# Patient Record
Sex: FEMALE | Race: WHITE | NOT HISPANIC OR LATINO | Employment: UNEMPLOYED | URBAN - METROPOLITAN AREA
[De-identification: names, ages, dates, MRNs, and addresses within clinical notes are randomized per-mention and may not be internally consistent; named-entity substitution may affect disease eponyms.]

---

## 2017-06-01 LAB — POC BETA-HCG (QUAL): NEGATIVE

## 2017-07-19 LAB — POC BETA-HCG (QUAL): NEGATIVE

## 2019-01-23 ENCOUNTER — HISTORICAL (OUTPATIENT)
Dept: ADMINISTRATIVE | Facility: HOSPITAL | Age: 39
End: 2019-01-23

## 2019-02-14 ENCOUNTER — HOSPITAL ENCOUNTER (OUTPATIENT)
Dept: MEDSURG UNIT | Facility: HOSPITAL | Age: 39
End: 2019-02-15
Attending: FAMILY MEDICINE | Admitting: FAMILY MEDICINE

## 2019-02-14 LAB
ABS NEUT (OLG): 10.6 X10(3)/MCL (ref 1.5–6.9)
ALBUMIN SERPL-MCNC: 4 GM/DL (ref 3.4–5)
ALBUMIN/GLOB SERPL: 1.1 RATIO
ALP SERPL-CCNC: 86 UNIT/L (ref 30–113)
ALT SERPL-CCNC: 25 UNIT/L (ref 10–45)
AMYLASE SERPL-CCNC: 48 UNIT/L (ref 25–115)
APPEARANCE, UA: CLEAR
APTT PPP: 26.1 SECOND(S) (ref 25–35)
AST SERPL-CCNC: 21 UNIT/L (ref 15–37)
BACTERIA SPEC CULT: ABNORMAL /HPF
BASOPHILS # BLD AUTO: 0.1 X10(3)/MCL (ref 0–0.1)
BASOPHILS NFR BLD AUTO: 0 % (ref 0–1)
BILIRUB SERPL-MCNC: 0.4 MG/DL (ref 0.1–0.9)
BILIRUB UR QL STRIP: ABNORMAL
BILIRUBIN DIRECT+TOT PNL SERPL-MCNC: 0.1 MG/DL (ref 0–0.3)
BILIRUBIN DIRECT+TOT PNL SERPL-MCNC: 0.3 MG/DL
BUN SERPL-MCNC: 15 MG/DL (ref 10–20)
CALCIUM SERPL-MCNC: 9.5 MG/DL (ref 8–10.5)
CHLORIDE SERPL-SCNC: 104 MMOL/L (ref 100–108)
CO2 SERPL-SCNC: 27 MMOL/L (ref 21–35)
COLOR UR: ABNORMAL
CREAT SERPL-MCNC: 0.94 MG/DL (ref 0.7–1.3)
EOSINOPHIL # BLD AUTO: 0.1 X10(3)/MCL (ref 0–0.6)
EOSINOPHIL NFR BLD AUTO: 1 % (ref 0–5)
ERYTHROCYTE [DISTWIDTH] IN BLOOD BY AUTOMATED COUNT: 13.5 % (ref 11.5–17)
FLUAV AG UPPER RESP QL IA.RAPID: NEGATIVE
FLUBV AG UPPER RESP QL IA.RAPID: NEGATIVE
GLOBULIN SER-MCNC: 3.8 GM/DL
GLUCOSE (UA): NEGATIVE
GLUCOSE SERPL-MCNC: 85 MG/DL (ref 75–116)
HCT VFR BLD AUTO: 44.6 % (ref 36–48)
HGB BLD-MCNC: 15.1 GM/DL (ref 12–16)
HGB UR QL STRIP: NEGATIVE
IMM GRANULOCYTES # BLD AUTO: 0.04 10*3/UL (ref 0–0.02)
IMM GRANULOCYTES NFR BLD AUTO: 0.3 % (ref 0–0.43)
INR PPP: 0.9 (ref 0–1.2)
KETONES UR QL STRIP: ABNORMAL
LEUKOCYTE ESTERASE UR QL STRIP: NEGATIVE
LIPASE SERPL-CCNC: 99 UNIT/L (ref 21–261)
LYMPHOCYTES # BLD AUTO: 2.4 X10(3)/MCL (ref 0.5–4.1)
LYMPHOCYTES NFR BLD AUTO: 17 % (ref 15–40)
MCH RBC QN AUTO: 33 PG (ref 27–34)
MCHC RBC AUTO-ENTMCNC: 34 GM/DL (ref 31–36)
MCV RBC AUTO: 97 FL (ref 80–99)
MONOCYTES # BLD AUTO: 0.8 X10(3)/MCL (ref 0–1.1)
MONOCYTES NFR BLD AUTO: 6 % (ref 4–12)
MUCOUS THREADS URNS QL MICRO: ABNORMAL
NEUTROPHILS # BLD AUTO: 10.6 X10(3)/MCL (ref 1.5–6.9)
NEUTROPHILS NFR BLD AUTO: 76 % (ref 43–75)
NITRITE UR QL STRIP: NEGATIVE
PH UR STRIP: 5.5 [PH]
PLATELET # BLD AUTO: 309 X10(3)/MCL (ref 140–400)
PMV BLD AUTO: 10.7 FL (ref 6.8–10)
POTASSIUM SERPL-SCNC: 4 MMOL/L (ref 3.6–5.2)
PROT SERPL-MCNC: 7.8 GM/DL (ref 6.4–8.2)
PROT UR QL STRIP: 30 MG/DL
PROTHROMBIN TIME: 9.7 SECOND(S) (ref 9–12)
RBC # BLD AUTO: 4.58 X10(6)/MCL (ref 4.2–5.4)
RBC #/AREA URNS HPF: ABNORMAL /HPF
SODIUM SERPL-SCNC: 141 MMOL/L (ref 135–145)
SP GR UR STRIP: >=1.03
SQUAMOUS EPITHELIAL, UA: ABNORMAL /LPF
STREP A PCR (OHS): NOT DETECTED
UROBILINOGEN UR STRIP-ACNC: 0.2 EU/DL
WBC # SPEC AUTO: 14.1 X10(3)/MCL (ref 4.5–11.5)
WBC #/AREA URNS HPF: ABNORMAL /HPF

## 2019-02-15 LAB
ABS NEUT (OLG): 8 X10(3)/MCL (ref 1.5–6.9)
ALBUMIN SERPL-MCNC: 3 GM/DL (ref 3.4–5)
ALBUMIN/GLOB SERPL: 0.9 RATIO
ALP SERPL-CCNC: 71 UNIT/L (ref 30–113)
ALT SERPL-CCNC: 19 UNIT/L (ref 10–45)
AST SERPL-CCNC: 13 UNIT/L (ref 15–37)
BASOPHILS # BLD AUTO: 0.1 X10(3)/MCL (ref 0–0.1)
BASOPHILS NFR BLD AUTO: 1 % (ref 0–1)
BILIRUB SERPL-MCNC: 0.4 MG/DL (ref 0.1–0.9)
BILIRUBIN DIRECT+TOT PNL SERPL-MCNC: 0.1 MG/DL (ref 0–0.3)
BILIRUBIN DIRECT+TOT PNL SERPL-MCNC: 0.3 MG/DL
BUN SERPL-MCNC: 13 MG/DL (ref 10–20)
CALCIUM SERPL-MCNC: 9 MG/DL (ref 8–10.5)
CHLORIDE SERPL-SCNC: 107 MMOL/L (ref 100–108)
CO2 SERPL-SCNC: 27 MMOL/L (ref 21–35)
CREAT SERPL-MCNC: 0.88 MG/DL (ref 0.7–1.3)
EOSINOPHIL # BLD AUTO: 0.2 X10(3)/MCL (ref 0–0.6)
EOSINOPHIL NFR BLD AUTO: 1 % (ref 0–5)
ERYTHROCYTE [DISTWIDTH] IN BLOOD BY AUTOMATED COUNT: 13.5 % (ref 11.5–17)
EST. AVERAGE GLUCOSE BLD GHB EST-MCNC: 94 MG/DL
GLOBULIN SER-MCNC: 3.2 GM/DL
GLUCOSE SERPL-MCNC: 102 MG/DL (ref 75–116)
HBA1C MFR BLD: 4.9 % (ref 4.8–6)
HCT VFR BLD AUTO: 38.4 % (ref 36–48)
HGB BLD-MCNC: 12.8 GM/DL (ref 12–16)
IMM GRANULOCYTES # BLD AUTO: 0.03 10*3/UL (ref 0–0.02)
IMM GRANULOCYTES NFR BLD AUTO: 0.2 % (ref 0–0.43)
LYMPHOCYTES # BLD AUTO: 3.4 X10(3)/MCL (ref 0.5–4.1)
LYMPHOCYTES NFR BLD AUTO: 28 % (ref 15–40)
MCH RBC QN AUTO: 33 PG (ref 27–34)
MCHC RBC AUTO-ENTMCNC: 33 GM/DL (ref 31–36)
MCV RBC AUTO: 99 FL (ref 80–99)
MONOCYTES # BLD AUTO: 0.7 X10(3)/MCL (ref 0–1.1)
MONOCYTES NFR BLD AUTO: 6 % (ref 4–12)
NEUTROPHILS # BLD AUTO: 8 X10(3)/MCL (ref 1.5–6.9)
NEUTROPHILS NFR BLD AUTO: 65 % (ref 43–75)
PLATELET # BLD AUTO: 261 X10(3)/MCL (ref 140–400)
PMV BLD AUTO: 10.4 FL (ref 6.8–10)
POTASSIUM SERPL-SCNC: 3.8 MMOL/L (ref 3.6–5.2)
PROT SERPL-MCNC: 6.2 GM/DL (ref 6.4–8.2)
RBC # BLD AUTO: 3.89 X10(6)/MCL (ref 4.2–5.4)
SODIUM SERPL-SCNC: 142 MMOL/L (ref 135–145)
TSH SERPL-ACNC: 2.67 MIU/ML (ref 0.36–3.74)
WBC # SPEC AUTO: 12.3 X10(3)/MCL (ref 4.5–11.5)

## 2019-02-17 LAB — FINAL CULTURE: NORMAL

## 2019-12-02 ENCOUNTER — HISTORICAL (OUTPATIENT)
Dept: RADIOLOGY | Facility: HOSPITAL | Age: 39
End: 2019-12-02

## 2020-01-13 LAB
ABS NEUT (OLG): 7.1 X10(3)/MCL (ref 1.5–6.9)
ALBUMIN SERPL-MCNC: 3.5 GM/DL (ref 3.4–5)
ALBUMIN/GLOB SERPL: 0.9 RATIO
ALP SERPL-CCNC: 78 UNIT/L (ref 30–113)
ALT SERPL-CCNC: 19 UNIT/L (ref 10–45)
APTT PPP: 27 SECOND(S) (ref 25–35)
AST SERPL-CCNC: 15 UNIT/L (ref 15–37)
BASOPHILS # BLD AUTO: 0.1 X10(3)/MCL (ref 0–0.1)
BASOPHILS NFR BLD AUTO: 1 % (ref 0–1)
BILIRUB SERPL-MCNC: 0.2 MG/DL (ref 0.1–0.9)
BILIRUBIN DIRECT+TOT PNL SERPL-MCNC: 0.1 MG/DL
BILIRUBIN DIRECT+TOT PNL SERPL-MCNC: 0.1 MG/DL (ref 0–0.3)
BUN SERPL-MCNC: 15 MG/DL (ref 10–20)
CALCIUM SERPL-MCNC: 9.5 MG/DL (ref 8–10.5)
CHLORIDE SERPL-SCNC: 105 MMOL/L (ref 100–108)
CO2 SERPL-SCNC: 28 MMOL/L (ref 21–35)
CREAT SERPL-MCNC: 0.73 MG/DL (ref 0.7–1.3)
EOSINOPHIL # BLD AUTO: 0.2 X10(3)/MCL (ref 0–0.6)
EOSINOPHIL NFR BLD AUTO: 2 % (ref 0–5)
ERYTHROCYTE [DISTWIDTH] IN BLOOD BY AUTOMATED COUNT: 12.6 % (ref 11.5–17)
GLOBULIN SER-MCNC: 3.8 GM/DL
GLUCOSE SERPL-MCNC: 88 MG/DL (ref 75–116)
HCT VFR BLD AUTO: 43.9 % (ref 36–48)
HGB BLD-MCNC: 15.1 GM/DL (ref 12–16)
IMM GRANULOCYTES # BLD AUTO: 0.03 10*3/UL (ref 0–0.02)
IMM GRANULOCYTES NFR BLD AUTO: 0.3 % (ref 0–0.43)
INR PPP: 0.9 (ref 0–1.2)
LYMPHOCYTES # BLD AUTO: 3.8 X10(3)/MCL (ref 0.5–4.1)
LYMPHOCYTES NFR BLD AUTO: 32 % (ref 15–40)
MCH RBC QN AUTO: 32 PG (ref 27–34)
MCHC RBC AUTO-ENTMCNC: 34 GM/DL (ref 31–36)
MCV RBC AUTO: 93 FL (ref 80–99)
MONOCYTES # BLD AUTO: 0.7 X10(3)/MCL (ref 0–1.1)
MONOCYTES NFR BLD AUTO: 6 % (ref 4–12)
NEUTROPHILS # BLD AUTO: 7.1 X10(3)/MCL (ref 1.5–6.9)
NEUTROPHILS NFR BLD AUTO: 60 % (ref 43–75)
PLATELET # BLD AUTO: 313 X10(3)/MCL (ref 140–400)
PMV BLD AUTO: 9.8 FL (ref 6.8–10)
POTASSIUM SERPL-SCNC: 4.5 MMOL/L (ref 3.6–5.2)
PROT SERPL-MCNC: 7.3 GM/DL (ref 6.4–8.2)
PROTHROMBIN TIME: 9.4 SECOND(S) (ref 9–12)
RBC # BLD AUTO: 4.7 X10(6)/MCL (ref 4.2–5.4)
SODIUM SERPL-SCNC: 142 MMOL/L (ref 135–145)
WBC # SPEC AUTO: 12 X10(3)/MCL (ref 4.5–11.5)

## 2020-01-15 LAB — B-HCG SERPL QL: NEGATIVE

## 2020-01-16 ENCOUNTER — HISTORICAL (OUTPATIENT)
Dept: ANESTHESIOLOGY | Facility: HOSPITAL | Age: 40
End: 2020-01-16

## 2021-10-14 ENCOUNTER — HISTORICAL (OUTPATIENT)
Dept: RADIOLOGY | Facility: HOSPITAL | Age: 41
End: 2021-10-14

## 2021-11-11 ENCOUNTER — HISTORICAL (OUTPATIENT)
Dept: WOUND CARE | Facility: HOSPITAL | Age: 41
End: 2021-11-11

## 2021-12-15 ENCOUNTER — HISTORICAL (OUTPATIENT)
Dept: RADIOLOGY | Facility: HOSPITAL | Age: 41
End: 2021-12-15

## 2021-12-29 ENCOUNTER — HISTORICAL (OUTPATIENT)
Dept: RADIOLOGY | Facility: HOSPITAL | Age: 41
End: 2021-12-29

## 2022-01-26 ENCOUNTER — HISTORICAL (OUTPATIENT)
Dept: ADMINISTRATIVE | Facility: HOSPITAL | Age: 42
End: 2022-01-26

## 2022-04-10 ENCOUNTER — HISTORICAL (OUTPATIENT)
Dept: ADMINISTRATIVE | Facility: HOSPITAL | Age: 42
End: 2022-04-10
Payer: MEDICAID

## 2022-04-26 VITALS
DIASTOLIC BLOOD PRESSURE: 81 MMHG | SYSTOLIC BLOOD PRESSURE: 114 MMHG | BODY MASS INDEX: 38.64 KG/M2 | WEIGHT: 231.94 LBS | HEIGHT: 65 IN

## 2022-04-30 NOTE — OP NOTE
ADMITTING DIAGNOSIS:  Chronic cholecystitis with gallstones in the gallbladder.    PROCEDURES:    1. Laparoscopic cholecystectomy.  2. Intraoperative cholangiography under fluoroscopy.    POSTOPERATIVE DIAGNOSES:    1. Chronic cholecystitis with gallstones in the gallbladder.  2. No common duct obstruction.  3. No pancreatitis.    INDICATIONS:  Patient is a 39-year-old  female with hypertension, osteoarthritis, anxiety, bipolar disorder with depression.  She has posttraumatic stress disorder secondary to issues during her childhood.  The patient recently had a gastric sleeve done in 2018 in [facility] and dropped weight from 395 pounds down to 215.  She lives in [place], works in home health and recently had left upper quadrant abdominal pain with fatty food intolerance.  The patient had a family history of biliary disease with nausea, vomiting, bloating, and an inability to maintain diet.  She had an ultrasound on 12/02/2019, that demonstrated gallstones in the gallbladder.  She was consented for cholecystectomy.    DESCRIPTION OF PROCEDURE:  Brought to the operating room in supine position.  General anesthetic.  Prepped and draped in a sterile fashion.  I chose to go ahead and do a supraumbilical incision with insertion of Laron needle, insufflation of abdomen to 14 mmHg with insertion of a 5 mm trocar.  The patient then underwent insertion of 2 lateral 5 mm trocars and one 5 mm trocar just to the right of falciform ligament.  The gallbladder was grasped and mobilized.  Cystic duct and artery were isolated.  [place] was clearly dissected.  Intraoperative cholangiogram was obtained.  The patient underwent clipping of the cystic duct and artery, cauterization of the gallbladder off the liver bed and removal through the epigastric trocar site.  The 5 mm trocar site aponeurosis was closed with 0 Vicryl on a  needle.  Subcu was closed with 4-0 plain gut suture.  Dermabond was used  for the skin.  It should be noted that there was some bleeding from the epigastric incision site that I suture-ligated with a GraNee needle and 0 Vicryl.  The patient had local anesthetic injected.  No problems were encountered.  I appreciate the consultation referral from  Lalo Gonzales and will notify him of my findings.        ESTEE/NILDA   DD: 01/16/2020 1347   DT: 01/16/2020 1407  Job # [id number]    cc: Lalo Gonzales NP

## 2022-05-03 NOTE — HISTORICAL [facility] CERNER
This is a historical note converted from [place]. Formatting and pictures may have been removed.  Please reference [place] for original formatting and attached multimedia. Admit and Discharge Dates  Admit Date: 02/14/2019  Discharge Date: 02/15/2019  ?  Physicians  Attending Physician - Drew RIVERA, Marito JARA  Admitting Physician - Drew RIVERA, Marito JARA  Primary Care Physician - Donato MERCHANT, Malaika Sewell  ?  Discharge Diagnosis  1.?Sigmoid diverticulitis?K57.32  2.?Acute colitis?K52.9  3.?Malaise?R53.81  4.?Throat pain - Adult?[id number]  5.?Diabetes mellitus?E11.9  6.?HTN (hypertension)?I10  7.?Anxiety?F41.9  Surgical Procedures  No procedures recorded for this visit.  Immunizations  No immunizations recorded for this visit.  ?  Hospital Course  Clinical course was?uneventful. ?She was started on IV?Flagyl and Cipro?after CT scan?of the abdomen showed?colitis and the beginning of a diverticulitis.?Labs were obtained and?trended to monitor response to?treatment.??Stool and throat cultures were?ordered?after her initial?strep?and influenza?screen?were negative.??Upon further assessment the patient,?it was determined that she has had multiple?episodes of tonsillitis?and was recommended in the past?be evaluated by ENT for removal.??Monospot was ordered. ?The rest of her clinical course?was uneventful. ?She remained afebrile,?her labs improved, and?she was started clear liquid diet?and it?was advanced as tolerated. ?She was subsequently medically cleared for discharge?in stable condition?with follow-up instructions and prescriptions  ?  D/C Time: 33 minutes  Significant Findings  Reason For Exam  Abdominal Pain  ?  Radiology Report  CT ABDOMEN AND PELVIS WITH CONTRAST:  ?  HISTORY: Abdominal Pain ? ??  ?  PATIENT RADIATION DOSE: ?CTDI vol(mGy) WITH: 71.40?  ?? ? ? ? ? ? ? ? ? ? ? ? ? ? ? ? ? ? ? ? ? ? ? DLP(mGycm) WITH:  3553.40?  ?  As per PQRS measures, all CT scans at this facility used  dose  modulation, iterative reconstruction, and/or weight based dose  adjustment when appropriate to reduce radiation dose to as low as  reasonably achievable.  ?  COMPARISON:None available  ?  FINDINGS: Serial axial images were obtained through the abdomen and  pelvis with the administration of ?IV contrast. Coronal and sagittal  reconstructions where also obtained. Degenerative changes are noted to  the thoracolumbar spine. The heart is upper normal in size. Mild hazy  groundglass interstitial markings are noted to the lung bases. There  is mild mucosal prominence at a small fixed hiatus hernia with suspect  adjacent surgical sutures. There is elevation of the anterior right  hemidiaphragm. The liver, gallbladder, spleen, adrenal glands, and  pancreas are grossly within normal limits without the administration  of IV contrast. The appendix is within normal limits. There is  localized mucosal thickening and edema/stranding at the ascending  colon. A few scattered diverticula are noted to the sigmoid colon with  mild mucosal prominence versus underdistention and questionable small  amount of surrounding edema. The uterus is normal in size. The bladder  is nondistended. No dilated loops of bowel are identified. No  significant free fluid collection is seen. The kidneys are relatively  symmetric in size. No hydronephrosis is identified. Minimal  atherosclerosis is seen within the iliac arteries. Multiple lymph  nodes measuring up to 1 cm are seen within the inguinal region  bilaterally.  ?  IMPRESSION:  1. Surrounding edema and mucosal thickening at the ascending colon  compatible with a localized infectious/inflammatory process/colitis.  The appendix and terminal ileum appear grossly within normal limits.  2. Small fixed hiatus hernia with adjacent surgical sutures at the  stomach  3. A few scattered diverticula at the sigmoid colon with mild mucosal  prominence and question small bifrontal edema. This may represent  a  mild or early acute diverticulitis.  ?  Objective  Vitals & Measurements  T:?36.6? ?C (Oral)? TMIN:?36.4? ?C (Oral)? TMAX:?36.9? ?C (Oral)? HR:?53(Monitored)? RR:?18? BP:?128/86? SpO2:?100%? WT:?114?kg?  Physical Exam  General: Alert, sitting up in bed, in no acute distress  Eyes: EOMI, no scleral icterus  HEENT: NCAT, oral mucosa moist, neck supple.  CV: RRR  Respiratory: CTA bilaterally  GI: + BS, soft, nontender, nondistended  : No CVA or suprapubic tenderness palpation  MSK/extremities: No pitting edema, cyanosis, or clubbing.? MAEW  Neuro: GCS 15  Psych: Calm, cooperative with exam.? Mood and affect appropriate.  Integument: Warm, dry  Patient Discharge Condition  Stable  Discharge Disposition  Home   Discharge Medication Reconciliation  Prescribed  ciprofloxacin (Cipro 500 mg oral tablet)?500 mg, Oral, q12hr  codeine-guaifenesin (Cheratussin AC 10 mg-100 mg/5 mL oral syrup)?10 mL, Oral, q4hr, PRN as needed for cough and congestion  lactobacillus acidophilus and bulgaricus (lactobacillus acidophilus and bulgaricus oral tablet, chewable)?4 tab(s), Oral, TIDWM  metroNIDAZOLE (Flagyl 500 mg oral tablet)?500 mg, Oral, TID  Continue  buPROPion (BUPROPION ? ?TAB 100MG SR)?100 mg, Oral, qAM  clonazePAM (clonazePAM 2 mg oral tablet)?2 mg, Oral, TID  doxepin (DOXEPIN HCL ?CAP 25MG)?25 mg, Oral, qPM  gabapentin (gabapentin 600 mg oral tablet)?See Instructions  lisinopril (LISINOPRIL ? TAB 10MG)?10 mg, Oral, Daily  methocarbamol (METHOCARBAM ?TAB 500MG)  naproxen (Naprosyn 500 mg oral tablet)?500 mg, Oral, BID, PRN as needed for pain  ?  Education and Orders Provided  Diverticulitis, Easy-to-Read  Colitis (JCANZALONE)  Discharge - 02/15/19 10:02:00 CST, Home Pending ability to tolerate breakfast?  Discharge Activity - Activity as Tolerated?  Discharge Diet - Low Fat?  ?  Follow up  Abraham Silvestre, within 2 days, on  ????For recurring tonsilitis  Follow-up with PCP in 3 to 5 days  Anytime the conditions worsen, return  to clinic or go to ED  ?

## 2022-05-03 NOTE — HISTORICAL [facility] CERNER
This is a historical note converted from [place]. Formatting and pictures may have been removed.  Please reference [place] for original formatting and attached multimedia. Chief Complaint  abd cramping, diarrhea, throat pain, tonsilis swolling. pt. reports throat pain first, progressed to abd pain after two days.  History of Present Illness  38-year-old female?with past medical history of hypertension anxiety?presents to the ED for a?3-day history of?abdominal pain.??Pain was?cramping, radiating from the epigastric area to the umbilicus to the right lower quadrant.? + Sick contacts. ?No improvement with over-the-counter Tylenol or Mucinex. It became associated with nonbloody, watery?diarrhea 2 days prior to admission?with?nausea, malaise?and?throat pain. ?ED workup showed a white count of 14,000?with a CT scan?of the abdomen was positive for a?with early signs of diverticulitis.? [facility] was consulted for admission.  Review of Systems  CONSTITUTIONAL: No weight loss, No fever, no chills,+ weakness,?+ fatigue.  EYES: No diplopia, no blurred vision.  ENT: No earache, no sore throat, no runny nose.  CARDIOVASCULAR:? No pressure, no squeezing, no strangling, no tightness, no heaviness, no aching about the chest, neck, axilla or epigastrium.  RESPIRATORY: No cough, no shortness of breath, no PND, no orthopnea.  GASTROINTESTINAL:?See HPI.  GENITOURINARY: No dysuria, no urinary frequency, no urgency, no dribbling.  MUSCULOSKELETAL: +?Muscle ache, no back pain, no joint pain, no joint stiffness.  SKIN:? No change in skin, no change in hair, no change in nails.  NEUROLOGIC:? No paresthesias, no fasciculations, no seizures, no weakness.  PSYCHIATRIC:? No disorder of thought or mood.  ENDOCRINE:? No heat or cold intolerance, no polyuria, no polydipsia.  HEMATOLOGICAL:? No easy bruising, no easy bleeding.  Physical Exam  Vitals & Measurements  T:?36.4? ?C (Oral)? TMIN:?36.4? ?C (Oral)? TMAX:?36.9? ?C (Oral)? HR:?66(Peripheral)?  RR:?18? BP:?128/82? SpO2:?99%? WT:?114?kg?  GEN: Patient alert. ?Obese female, In no pain or acute?distress  Eyes: No scleral icterus, EOMI  HENT: NCAT, OMM, neck supple  CV: RRR. No M/R/G, No JVD noted. peripheral pulses palpable  RESP: Symmetrical rise and fall of chest. Breathing unlabored. CTA B/L. No wheezing, rhonchi or crackles  ABD: Hyperactive BS noted in all 4 quadrants, no HSM, soft, generalized tenderness?without rebound?tenderness or guarding, ND  MSK/EXT: No gross deformities noted, MAEW  SKIN: Dry, Warm, Normal Turgor  NEURO: GCS of 15, sensation and strength grossly normal.  PSYCH: Mood and affect appropriate, Good judgement, cooperative with exam  Assessment/Plan  1.?Sigmoid diverticulitis?K57.32  2.?Acute colitis?K52.9  3.?Malaise?R53.81  4.?Throat pain - Adult?[id number]  5.?Diabetes mellitus?E11.9  6.?HTN (hypertension)?I10  7.?Anxiety?F41.9  Orders:  buPROPion, 100 mg, form: Tab, Oral, qAM, first dose 02/14/19 20:18:00 CST, STAT  doxepin, 25 mg, form: Cap, Oral, qPM, first dose 02/14/19 20:18:00 CST, STAT  enoxaparin, 40 mg, form: Injection, Subcutaneous, Daily, first dose 02/15/19 9:00:00 CST, for all risk catagories  gabapentin, form: Tab, Oral, QID, first dose 02/14/19 20:18:00 CST, STAT  lisinopril, 10 mg, form: Tab, Oral, Daily, first dose 02/14/19 20:18:00 CST, STAT  pantoprazole + Sodium Chloride 0.9% intravenous solution 100 mL, 40 mg, IV Piggyback, BID, Infuse over: 1 hr, first dose 02/14/19 21:00:00 CST  Comprehensive Metabolic Panel, Routine collect, 02/15/19 5:00:00 CST, Blood, Stop date 02/15/19 5:00:00 CST, Lab Collect, in AM, 02/15/19 5:00:00 CST  Fecal Leukocytes - Lactoferrin on stool, Stat collect, 02/14/19 20:20:00 CST, Stool, Stop date 02/14/19 20:21:00 CST, Nurse collect, 02/14/19 20:20:00 CST  Hemoglobin A1c, Routine collect, 02/15/19 5:00:00 CST, Blood, Stop date 02/15/19 5:00:00 CST, Lab Collect, 02/15/19 5:00:00 CST  Occult Blood Stool Screening  Test, Stat collect, 02/14/19 20:20:00 CST, Stool, Stop date 02/14/19 20:21:00 CST, Nurse collect, 02/14/19 20:20:00 CST  Stool Culture, Stat collect, 02/14/19 20:20:00 CST, Stool, Nurse collect, Stop date 02/14/19 20:21:00 CST  Thyroid Stimulating Hormone, Routine collect, 02/15/19 5:00:00 CST, Blood, Stop date 02/15/19 5:00:00 CST, Lab Collect, 02/15/19 5:00:00 CST  -Start IV Flagyl and Cipro  -Probiotics?3 times daily with meals  -Obtain stool culture if sample provided  -Trend labs to monitor response to treatment?  -Replete electrolytes as warranted  -GI/DVT prophylaxis as warranted  ?  Reviewed and restarted appropriate home medications once reconciled to ensure that chronic medical problems remained stable during admission   Problem List/Past Medical History  Ongoing  Anxiety  Back pain  Diabetes mellitus  Diverticulosis of sigmoid colon  HTN (hypertension)  HTN - Hypertension  Morbid obesity  Obesity  Historical  No qualifying data  Procedure/Surgical History  Ovarian cyst (2004)  Laparoscopic right ovarian cystectomy  Sleeve resection of stomach   Medications  Inpatient  buPROPion 100 mg oral tablet, 100 mg= 1 tab(s), Oral, qAM  ciprofloxacin 400 mg/200 mL intravenous solution, 400 mg= 200 mL, IV, q12hr  doxepin 25 mg oral capsule, 25 mg= 1 cap(s), Oral, qPM  enoxaparin 40 mg/0.4 mL subcutaneous solution, 40 mg= 0.4 mL, Subcutaneous, Daily  gabapentin 600 mg oral tablet, 1 tab(s), Oral, QID  Lactated Ringers Injection intravenous solution 1,000 mL, 1000 mL, IV  lactobacillus acidophilus and bulgaricus oral tablet, chewable, 1 tab(s), Oral, TID  lisinopril 10 mg oral tablet, 10 mg= 1 tab(s), Oral, Daily  Metronidazole 500 mg / 100 ml premix, 500 mg= 100 mL, IV, q8hr  morphine 2 mg/mL injectable solution, 2 mg= 0.5 mL, IV, q4hr, PRN  NS (0.9% Sodium Chloride) Infusion 1,000 mL, 1000 mL, IV  Protonix IV 40 mg intravenous injection +  100 mL  Zofran 2 mg/mL injectable solution, 4 mg= 2 mL, IV Push, q4hr,  PRN  Home  BUPROPION TAB 100MG SR, 100 mg= 1 tab(s), Oral, qAM  clonazePAM 2 mg oral tablet, 2 mg= 1 tab(s), Oral, TID  DOXEPIN HCL CAP 25MG, 25 mg, Oral, qPM  gabapentin 600 mg oral tablet, See Instructions, 11 refills  LISINOPRIL TAB 10MG, 10 mg= 1 tab(s), Oral, Daily  METHOCARBAM TAB 500MG  Naprosyn 500 mg oral tablet, 500 mg= 1 tab(s), Oral, BID, PRN, 1 refills  Allergies  No Known Allergies  Social History  Alcohol - No Risk, 06/02/2015  Past, Wine, 1-2 times per year, 01/23/2019  Employment/School  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 Activity level: Moderate physical work. Highest education level: High school. Operates hazardous equipment: No., 05/24/2017  Exercise - Does not exercise, 06/02/2015  Self assessment: Poor condition., 05/24/2017  Home/Environment  Lives with Children, Significant other. Living situation: Home/Independent. Alcohol abuse in household: No. Substance abuse in household: No. Smoker in household: No. Injuries/Abuse/Neglect in household: No. Feels unsafe at home: No. Family/Friends available for support: Yes. Concern for family members at home: No. Major illness in household: No. Financial concerns: No. TV/Computer concerns: No., 05/24/2017  Nutrition/Health  Regular, 06/02/2015  Sexual  Sexual orientation: Lesbian, diehl or homosexual., 01/23/2019  Substance Abuse - No Risk, 06/02/2015  Never, 05/07/2016  Tobacco - No Risk, 06/02/2015  Never (less than 100 in lifetime), N/A, 02/14/2019  Never (less than 100 in lifetime), N/A, 01/23/2019  Family History  Hypertension.: Mother and Father.  Lab Results  Test Name Test Result Date/Time   Sodium Lvl 141 mmol/L 02/14/2019 14:48 CST   Potassium Lvl 4.0 mmol/L 02/14/2019 14:48 CST   Chloride 104 mmol/L 02/14/2019 14:48 CST   CO2 27 mmol/L 02/14/2019 14:48 CST   Glucose Lvl 85 mg/dL 02/14/2019 14:48 CST   BUN 15 mg/dL 02/14/2019 14:48 CST   Creatinine 0.94 mg/dL 02/14/2019 14:48 CST   INR 0.9 02/14/2019 14:48 CST   WBC 14.1  x10(3)/mcL (High) 02/14/2019 14:48 CST   Hgb 15.1 gm/dL 02/14/2019 14:48 CST   Hct 44.6 % 02/14/2019 14:48 CST   Platelet 309 x10(3)/mcL 02/14/2019 14:48 CST   Influ A PCR Negative UA 02/14/2019 15:09 CST   Influ B PCR Negative UA 02/14/2019 15:09 CST   Strep A PCR NOT DETECTED. 02/14/2019 15:09 CST   Diagnostic Results  (02/14/2019 16:25 CST CT Abdomen and Pelvis W Contrast)  Reason For Exam  Abdominal Pain  ?  Radiology Report  CT ABDOMEN AND PELVIS WITH CONTRAST:  ?  HISTORY: Abdominal Pain ? ??  ?  PATIENT RADIATION DOSE: ?CTDI vol(mGy) WITH: 71.40?  ?? ? ? ? ? ? ? ? ? ? ? ? ? ? ? ? ? ? ? ? ? ? ? DLP(mGycm) WITH:  3553.40?  ?  As per PQRS measures, all CT scans at this facility used dose  modulation, iterative reconstruction, and/or weight based dose  adjustment when appropriate to reduce radiation dose to as low as  reasonably achievable.  ?  COMPARISON:None available  ?  FINDINGS: Serial axial images were obtained through the abdomen and  pelvis with the administration of ?IV contrast. Coronal and sagittal  reconstructions where also obtained. Degenerative changes are noted to  the thoracolumbar spine. The heart is upper normal in size. Mild hazy  groundglass interstitial markings are noted to the lung bases. There  is mild mucosal prominence at a small fixed hiatus hernia with suspect  adjacent surgical sutures. There is elevation of the anterior right  hemidiaphragm. The liver, gallbladder, spleen, adrenal glands, and  pancreas are grossly within normal limits without the administration  of IV contrast. The appendix is within normal limits. There is  localized mucosal thickening and edema/stranding at the ascending  colon. A few scattered diverticula are noted to the sigmoid colon with  mild mucosal prominence versus underdistention and questionable small  amount of surrounding edema. The uterus is normal in size. The bladder  is nondistended. No dilated loops of bowel are identified. No  significant free  fluid collection is seen. The kidneys are relatively  symmetric in size. No hydronephrosis is identified. Minimal  atherosclerosis is seen within the iliac arteries. Multiple lymph  nodes measuring up to 1 cm are seen within the inguinal region  bilaterally.  ?  IMPRESSION:  1. Surrounding edema and mucosal thickening at the ascending colon  compatible with a localized infectious/inflammatory process/colitis.  The appendix and terminal ileum appear grossly within normal limits.  2. Small fixed hiatus hernia with adjacent surgical sutures at the  stomach  3. A few scattered diverticula at the sigmoid colon with mild mucosal  prominence and question small bifrontal edema. This may represent a  mild or early acute diverticulitis.  ?  ? [1]     [1]?CT Abdomen and Pelvis W Contrast; René RIVERA, David Eisenberg 02/14/2019 16:25 CST

## 2022-05-24 ENCOUNTER — HOSPITAL ENCOUNTER (OUTPATIENT)
Dept: RADIOLOGY | Facility: HOSPITAL | Age: 42
Discharge: HOME | End: 2022-05-24
Attending: NURSE PRACTITIONER
Payer: MEDICAID

## 2022-05-24 DIAGNOSIS — R52 PAIN: ICD-10-CM

## 2022-05-24 PROCEDURE — 72070 X-RAY EXAM THORAC SPINE 2VWS: CPT | Mod: TC

## 2022-05-24 PROCEDURE — 72100 X-RAY EXAM L-S SPINE 2/3 VWS: CPT | Mod: TC

## 2022-05-24 PROCEDURE — 73560 X-RAY EXAM OF KNEE 1 OR 2: CPT | Mod: TC,RT

## 2022-08-09 ENCOUNTER — HOSPITAL ENCOUNTER (OUTPATIENT)
Dept: RADIOLOGY | Facility: HOSPITAL | Age: 42
Discharge: HOME | End: 2022-08-09
Attending: NURSE PRACTITIONER
Payer: MEDICAID

## 2022-08-09 DIAGNOSIS — M54.12 CERVICAL RADICULOPATHY: ICD-10-CM

## 2022-08-09 PROCEDURE — 72050 X-RAY EXAM NECK SPINE 4/5VWS: CPT | Mod: TC

## 2022-09-21 ENCOUNTER — HISTORICAL (OUTPATIENT)
Dept: ADMINISTRATIVE | Facility: HOSPITAL | Age: 42
End: 2022-09-21
Payer: MEDICAID

## 2022-12-22 ENCOUNTER — HOSPITAL ENCOUNTER (OUTPATIENT)
Dept: RADIOLOGY | Facility: HOSPITAL | Age: 42
Discharge: HOME | End: 2022-12-22
Attending: NURSE PRACTITIONER
Payer: MEDICAID

## 2022-12-22 DIAGNOSIS — Z12.31 ENCOUNTER FOR SCREENING MAMMOGRAM FOR BREAST CANCER: ICD-10-CM

## 2023-02-15 DIAGNOSIS — L98.7 EXCESS SKIN OF ABDOMEN: Primary | ICD-10-CM

## 2023-03-27 PROBLEM — M51.369 DEGENERATIVE DISC DISEASE, LUMBAR: Status: ACTIVE | Noted: 2023-03-27

## 2023-03-27 PROBLEM — M51.36 DEGENERATIVE DISC DISEASE, LUMBAR: Status: ACTIVE | Noted: 2023-03-27

## 2023-04-25 PROBLEM — E11.9 DIABETES MELLITUS: Status: ACTIVE | Noted: 2023-04-25

## 2023-04-25 PROBLEM — F41.9 ANXIETY DISORDER, UNSPECIFIED: Status: ACTIVE | Noted: 2023-04-25

## 2023-04-25 PROBLEM — R20.2 NUMBNESS AND TINGLING: Status: ACTIVE | Noted: 2023-04-25

## 2023-04-25 PROBLEM — R20.0 NUMBNESS AND TINGLING: Status: ACTIVE | Noted: 2023-04-25

## 2023-04-25 PROBLEM — M54.50 LOW BACK PAIN RADIATING TO RIGHT LEG: Status: ACTIVE | Noted: 2023-04-25

## 2023-04-25 PROBLEM — M79.604 LOW BACK PAIN RADIATING TO RIGHT LEG: Status: ACTIVE | Noted: 2023-04-25

## 2023-04-27 PROBLEM — R42 POSTURAL DIZZINESS WITH PRESYNCOPE: Status: ACTIVE | Noted: 2023-04-27

## 2023-04-27 PROBLEM — E66.9 OBESITY: Status: ACTIVE | Noted: 2023-04-27

## 2023-04-27 PROBLEM — M48.061 SPINAL STENOSIS OF LUMBAR REGION: Status: ACTIVE | Noted: 2023-04-27

## 2023-04-27 PROBLEM — M54.9 BACK PAIN: Status: ACTIVE | Noted: 2023-04-27

## 2023-04-27 PROBLEM — K57.30 DIVERTICULOSIS OF SIGMOID COLON: Status: ACTIVE | Noted: 2023-04-27

## 2023-04-27 PROBLEM — R55 POSTURAL DIZZINESS WITH PRESYNCOPE: Status: ACTIVE | Noted: 2023-04-27

## 2023-04-27 PROBLEM — I10 HYPERTENSION: Status: ACTIVE | Noted: 2023-04-27

## 2023-04-30 PROBLEM — R55 VASOVAGAL EPISODE: Status: ACTIVE | Noted: 2023-04-30

## 2023-06-13 ENCOUNTER — CLINICAL SUPPORT (OUTPATIENT)
Dept: RESPIRATORY THERAPY | Facility: HOSPITAL | Age: 43
End: 2023-06-13
Attending: NURSE PRACTITIONER
Payer: MEDICAID

## 2023-06-13 DIAGNOSIS — I10 ESSENTIAL HYPERTENSION, MALIGNANT: Primary | ICD-10-CM

## 2023-06-13 DIAGNOSIS — I10 ESSENTIAL HYPERTENSION, MALIGNANT: ICD-10-CM

## 2023-06-13 PROCEDURE — 93010 ELECTROCARDIOGRAM REPORT: CPT | Mod: ,,, | Performed by: INTERNAL MEDICINE

## 2023-06-13 PROCEDURE — 93005 ELECTROCARDIOGRAM TRACING: CPT

## 2023-06-13 PROCEDURE — 93010 EKG 12-LEAD: ICD-10-PCS | Mod: ,,, | Performed by: INTERNAL MEDICINE

## 2023-06-29 ENCOUNTER — HOSPITAL ENCOUNTER (OUTPATIENT)
Dept: RADIOLOGY | Facility: HOSPITAL | Age: 43
Discharge: HOME | End: 2023-06-29
Attending: NURSE PRACTITIONER
Payer: MEDICAID

## 2023-06-29 DIAGNOSIS — Z12.31 BREAST CANCER SCREENING BY MAMMOGRAM: ICD-10-CM

## 2023-06-29 PROCEDURE — 77067 SCR MAMMO BI INCL CAD: CPT | Mod: 26,,, | Performed by: STUDENT IN AN ORGANIZED HEALTH CARE EDUCATION/TRAINING PROGRAM

## 2023-06-29 PROCEDURE — 77067 MAMMO DIGITAL SCREENING BILAT WITH TOMO: ICD-10-PCS | Mod: 26,,, | Performed by: STUDENT IN AN ORGANIZED HEALTH CARE EDUCATION/TRAINING PROGRAM

## 2023-06-29 PROCEDURE — 77067 SCR MAMMO BI INCL CAD: CPT | Mod: TC

## 2023-06-29 PROCEDURE — 77063 BREAST TOMOSYNTHESIS BI: CPT | Mod: 26,,, | Performed by: STUDENT IN AN ORGANIZED HEALTH CARE EDUCATION/TRAINING PROGRAM

## 2023-06-29 PROCEDURE — 77063 MAMMO DIGITAL SCREENING BILAT WITH TOMO: ICD-10-PCS | Mod: 26,,, | Performed by: STUDENT IN AN ORGANIZED HEALTH CARE EDUCATION/TRAINING PROGRAM

## 2024-08-09 ENCOUNTER — CLINICAL SUPPORT (OUTPATIENT)
Dept: RESPIRATORY THERAPY | Facility: HOSPITAL | Age: 44
End: 2024-08-09
Attending: NURSE PRACTITIONER
Payer: MEDICAID

## 2024-08-09 ENCOUNTER — HOSPITAL ENCOUNTER (OUTPATIENT)
Dept: RADIOLOGY | Facility: HOSPITAL | Age: 44
Discharge: HOME | End: 2024-08-09
Attending: NURSE PRACTITIONER
Payer: MEDICAID

## 2024-08-09 DIAGNOSIS — Z01.818 PREOP EXAMINATION: Primary | ICD-10-CM

## 2024-08-09 DIAGNOSIS — Z01.811 PRE-OP CHEST EXAM: ICD-10-CM

## 2024-08-09 DIAGNOSIS — Z01.818 PREOP EXAMINATION: ICD-10-CM

## 2024-08-09 LAB
OHS QRS DURATION: 80 MS
OHS QTC CALCULATION: 427 MS

## 2024-08-09 PROCEDURE — 93010 ELECTROCARDIOGRAM REPORT: CPT | Mod: ,,, | Performed by: INTERNAL MEDICINE

## 2024-08-09 PROCEDURE — 93005 ELECTROCARDIOGRAM TRACING: CPT

## 2024-08-09 PROCEDURE — 71046 X-RAY EXAM CHEST 2 VIEWS: CPT | Mod: TC

## 2024-08-13 ENCOUNTER — HOSPITAL ENCOUNTER (OUTPATIENT)
Dept: RADIOLOGY | Facility: HOSPITAL | Age: 44
Discharge: HOME | End: 2024-08-13
Attending: STUDENT IN AN ORGANIZED HEALTH CARE EDUCATION/TRAINING PROGRAM
Payer: MEDICAID

## 2024-08-13 DIAGNOSIS — M17.11 PRIMARY OSTEOARTHRITIS OF RIGHT KNEE: ICD-10-CM

## 2024-08-13 PROCEDURE — 73700 CT LOWER EXTREMITY W/O DYE: CPT | Mod: TC,RT

## 2024-09-05 PROBLEM — M17.11 PRIMARY OSTEOARTHRITIS OF RIGHT KNEE: Status: ACTIVE | Noted: 2024-09-05

## 2025-01-08 DIAGNOSIS — R10.30 LOWER ABDOMINAL PAIN: Primary | ICD-10-CM

## 2025-06-02 DIAGNOSIS — M25.561 KNEE PAIN, RIGHT: Primary | ICD-10-CM

## 2025-06-02 DIAGNOSIS — Z12.4 CERVICAL CANCER SCREENING: Primary | ICD-10-CM

## 2025-06-10 ENCOUNTER — HOSPITAL ENCOUNTER (OUTPATIENT)
Dept: RADIOLOGY | Facility: HOSPITAL | Age: 45
Discharge: HOME | End: 2025-06-10
Attending: NURSE PRACTITIONER
Payer: MEDICAID

## 2025-06-10 DIAGNOSIS — Z12.31 OTHER SCREENING MAMMOGRAM: ICD-10-CM

## 2025-06-10 PROCEDURE — 77063 BREAST TOMOSYNTHESIS BI: CPT | Mod: 26,,, | Performed by: STUDENT IN AN ORGANIZED HEALTH CARE EDUCATION/TRAINING PROGRAM

## 2025-06-10 PROCEDURE — 77067 SCR MAMMO BI INCL CAD: CPT | Mod: 26,,, | Performed by: STUDENT IN AN ORGANIZED HEALTH CARE EDUCATION/TRAINING PROGRAM

## 2025-06-10 PROCEDURE — 77063 BREAST TOMOSYNTHESIS BI: CPT | Mod: TC

## 2025-06-23 ENCOUNTER — HOSPITAL ENCOUNTER (OUTPATIENT)
Dept: RADIOLOGY | Facility: HOSPITAL | Age: 45
Discharge: HOME | End: 2025-06-23
Attending: STUDENT IN AN ORGANIZED HEALTH CARE EDUCATION/TRAINING PROGRAM
Payer: MEDICAID

## 2025-06-23 DIAGNOSIS — M17.11 PRIMARY OSTEOARTHRITIS OF RIGHT KNEE: ICD-10-CM

## 2025-06-23 PROCEDURE — 73562 X-RAY EXAM OF KNEE 3: CPT | Mod: TC,RT
